# Patient Record
Sex: FEMALE | Race: WHITE
[De-identification: names, ages, dates, MRNs, and addresses within clinical notes are randomized per-mention and may not be internally consistent; named-entity substitution may affect disease eponyms.]

---

## 2017-09-07 ENCOUNTER — HOSPITAL ENCOUNTER (EMERGENCY)
Dept: HOSPITAL 10 - FTE | Age: 42
Discharge: HOME | End: 2017-09-07
Payer: MEDICAID

## 2017-09-07 VITALS
BODY MASS INDEX: 20.51 KG/M2 | HEIGHT: 64 IN | WEIGHT: 120.15 LBS | WEIGHT: 120.15 LBS | HEIGHT: 64 IN | BODY MASS INDEX: 20.51 KG/M2

## 2017-09-07 DIAGNOSIS — Z79.82: ICD-10-CM

## 2017-09-07 DIAGNOSIS — I10: ICD-10-CM

## 2017-09-07 DIAGNOSIS — M25.462: Primary | ICD-10-CM

## 2017-09-07 LAB
ANION GAP SERPL CALC-SCNC: 15 MMOL/L (ref 8–16)
BASOPHILS # BLD AUTO: 0.1 10^3/UL (ref 0–0.1)
BASOPHILS NFR BLD: 0.9 % (ref 0–2)
BUN SERPL-MCNC: 13 MG/DL (ref 7–20)
CALCIUM SERPL-MCNC: 9.2 MG/DL (ref 8.4–10.2)
CHLORIDE SERPL-SCNC: 105 MMOL/L (ref 97–110)
CO2 SERPL-SCNC: 25 MMOL/L (ref 21–31)
CREAT SERPL-MCNC: 0.51 MG/DL (ref 0.44–1)
EOSINOPHIL # BLD: 0.1 10^3/UL (ref 0–0.5)
EOSINOPHIL NFR BLD: 1.5 % (ref 0–7)
ERYTHROCYTE [DISTWIDTH] IN BLOOD BY AUTOMATED COUNT: 12.9 % (ref 11.5–14.5)
GLUCOSE SERPL-MCNC: 94 MG/DL (ref 70–220)
HCT VFR BLD CALC: 44.3 % (ref 37–47)
HGB BLD-MCNC: 14.9 G/DL (ref 12–16)
LYMPHOCYTES # BLD AUTO: 2.2 10^3/UL (ref 0.8–2.9)
LYMPHOCYTES NFR BLD AUTO: 33.3 % (ref 15–51)
MCH RBC QN AUTO: 27.4 PG (ref 29–33)
MCHC RBC AUTO-ENTMCNC: 33.6 G/DL (ref 32–37)
MCV RBC AUTO: 81.4 FL (ref 82–101)
MONOCYTES # BLD: 0.6 10^3/UL (ref 0.3–0.9)
MONOCYTES NFR BLD: 8.4 % (ref 0–11)
NEUTROPHILS NFR BLD AUTO: 55.7 % (ref 39–77)
NRBC # BLD MANUAL: 0 10^3/UL (ref 0–0)
NRBC BLD AUTO-RTO: 0 /100WBC (ref 0–0)
PLATELET # BLD: 285 10^3/UL (ref 140–415)
PMV BLD AUTO: 9.9 FL (ref 7.4–10.4)
POTASSIUM SERPL-SCNC: 3.5 MMOL/L (ref 3.5–5.1)
RBC # BLD AUTO: 5.44 10^6/UL (ref 4.2–5.4)
SODIUM SERPL-SCNC: 141 MMOL/L (ref 135–144)
WBC # BLD AUTO: 6.6 10^3/UL (ref 4.8–10.8)

## 2017-09-07 PROCEDURE — 73562 X-RAY EXAM OF KNEE 3: CPT

## 2017-09-07 PROCEDURE — 85025 COMPLETE CBC W/AUTO DIFF WBC: CPT

## 2017-09-07 PROCEDURE — 93971 EXTREMITY STUDY: CPT

## 2017-09-07 PROCEDURE — 36415 COLL VENOUS BLD VENIPUNCTURE: CPT

## 2017-09-07 PROCEDURE — 80048 BASIC METABOLIC PNL TOTAL CA: CPT

## 2017-09-07 NOTE — RADRPT
PROCEDURE:   Left knee x-ray 

 

CLINICAL INDICATION:   Knee pain 

 

TECHNIQUE:   AP, lateral and oblique views of the left knee were obtained. 

 

COMPARISON:   None 

 

FINDINGS:

There is normal mineralization.  

No acute fracture or dislocation is seen.  

There are no significant degenerative changes.  

There is a small joint effusion. 

There is no significant soft tissue swelling. 

 

RPTAT: AA

 

IMPRESSION:

Small joint effusion.

_____________________________________________ 

.Jae Mcintyre MD, MD           Date    Time 

Electronically viewed and signed by .Jae Mcintyre MD, MD on 09/07/2017 12:08 

 

D:  09/07/2017 12:08  T:  09/07/2017 12:08

.S/

## 2017-09-07 NOTE — RADRPT
PROCEDURE:   US Lower extremity Venous. 

 

CLINICAL INDICATION:   Lower extremity pain and swelling 

 

TECHNIQUE:   Multiple sonographic images of the left lower extremity deep venous system was obtained
 utilizing grayscale, color-flow, compressive sonography and doppler imaging with augmentation.  The
 images were reviewed on a PACS workstation. 

 

COMPARISON:   None. 

 

FINDINGS:

There is normal compressibility and flow within the left common femoral, superficial femoral and pop
liteal veins. Antegrade flow seen in the posterior tibial and peroneal veins.

 

IMPRESSION:

No sonographic evidence for deep venous thrombosis. 

 

 

RPTAT: AA

_____________________________________________ 

.Jared Casillas MD, MD           Date    Time 

Electronically viewed and signed by .Jared Casillas MD, MD on 09/07/2017 11:43 

 

D:  09/07/2017 11:43  T:  09/07/2017 11:43

.P/

## 2017-09-07 NOTE — ERD
ER Documentation


Chief Complaint


Date/Time


DATE: 17 


TIME: 14:31


Chief Complaint


LEFT  KNEE PAIN,SOB STARTED LAST NIGHT.DENIES MED HX





HPI


42-year-old female complaining of left knee pain and swelling 2 weeks.  

Patient stated the pain is worse when she tried to bend her knee.  She is able 

to bear weight.  Denies fall or injury.  Denies fever or chills.  Patient has 

history of hypertension, but is not currently taking any medication.  Denies 

history of gout.





ROS


All systems reviewed and are negative except as per history of present illness.





Medications


Home Meds


Active Scripts


Ibuprofen* (Motrin*) 600 Mg Tab, 600 MG PO Q6H Y for PAIN AND OR ELEVATED TEMP, 

#30 TAB


   Prov:JANNA ZUNIGA NP         17


Metoprolol Tartrate* (Lopressor*) 25 Mg Tab, 12.5 MG PO BID for 30 Days, #60 TAB


   Prov:WILMAR LOVING MD         6/15/16


Reported Medications


Hydrochlorothiazide* (Hydrochlorothiazide*) 12.5 Mg Tablet, 12.5 MG PO DAILY, #

30 TAB


   16


Aspirin (Aspir-Low) 81 Mg Tablet.dr, 81 MG PO DAILY


   16





Allergies


Allergies:  


Coded Allergies:  


     No Known Allergy (Unverified , 16)





PMhx/Soc


History of Surgery:  Yes ( , tubal ligation)


Anesthesia Reaction:  No


Hx Neurological Disorder:  No


Hx Respiratory Disorders:  No


Hx Cardiac Disorders:  Yes (HTN)


Hx Psychiatric Problems:  No


Hx Miscellaneous Medical Probl:  No


Hx Alcohol Use:  No


Hx Substance Use:  No


Hx Tobacco Use:  No


Smoking Status:  Never smoker





Physical Exam


Vitals





Vital Signs








  Date Time  Temp Pulse Resp B/P Pulse Ox O2 Delivery O2 Flow Rate FiO2


 


17 09:32 98.8 86 18 160/83 98   








Physical Exam


General:    Well-developed, well-nourished, conscious and coherent, in no 

distress.  Patient is crying.


Skin:    Warm and dry without rash, good texture and turgor


Head:    Normocephalic without evidence of trauma


Eyes:    Sclera and conjunctivae normal; pupils equal, round, and reactive to 

light; extraocular movements are intact


Chest:    Normal AP diameter.  Good expansion without retractions.  Nontender.  

Lungs are clear to auscultate bilaterally with good tidal volume


Heart:    Regular rate and rhythm.  No murmur, rub, or gallops heard


Extremities:    Swelling and tenderness at the medial superior aspect of the 

left knee, normal passive range of motion.  Good strength bilaterally.  No 

clubbing, cyanosis, or erythema. Peripheral pulses are intact. Sensation intact


Neuro:    Alert and oriented 4, GCS 15.  Cranial nerves grossly intact.  Motor 

and sensory exams nonfocal.  Moves all extremities.  Speech clear.  Gait normal


Result Diagram:  


17 1124                                                                    

            17 1124





Results 24 hrs





 Laboratory Tests








Test


  17


11:24


 


White Blood Count 6.610^3/ul 


 


Red Blood Count 5.4410^6/ul 


 


Hemoglobin 14.9g/dl 


 


Hematocrit 44.3% 


 


Mean Corpuscular Volume 81.4fl 


 


Mean Corpuscular Hemoglobin 27.4pg 


 


Mean Corpuscular Hemoglobin


Concent 33.6g/dl 


 


 


Red Cell Distribution Width 12.9% 


 


Platelet Count 48705^3/UL 


 


Mean Platelet Volume 9.9fl 


 


Neutrophils % 55.7% 


 


Lymphocytes % 33.3% 


 


Monocytes % 8.4% 


 


Eosinophils % 1.5% 


 


Basophils % 0.9% 


 


Nucleated Red Blood Cells % 0.0/100WBC 


 


Neutrophils # (Manual) 3.710^3/ul 


 


Lymphocytes # 2.210^3/ul 


 


Monocytes # 0.610^3/ul 


 


Eosinophils # 0.110^3/ul 


 


Basophils # 0.110^3/ul 


 


Nucleated Red Blood Cells # 0.010^3/ul 


 


Sodium Level 141mmol/L 


 


Potassium Level 3.5mmol/L 


 


Chloride Level 105mmol/L 


 


Carbon Dioxide Level 25mmol/L 


 


Anion Gap 15 


 


Blood Urea Nitrogen 13mg/dl 


 


Creatinine 0.51mg/dl 


 


Glucose Level 94mg/dl 


 


Calcium Level 9.2mg/dl 








 Current Medications








 Medications


  (Trade)  Dose


 Ordered  Sig/Anayeli


 Route


 PRN Reason  Start Time


 Stop Time Status Last Admin


Dose Admin


 


 Ibuprofen


  (Motrin)  600 mg  ONCE  ONCE


 PO


   17 11:30


 17 11:31 DC 17 11:21


 





PROCEDURE:   Left knee x-ray 


 


CLINICAL INDICATION:   Knee pain 


 


TECHNIQUE:   AP, lateral and oblique views of the left knee were obtained. 


 


COMPARISON:   None 


 


FINDINGS:


There is normal mineralization.  


No acute fracture or dislocation is seen.  


There are no significant degenerative changes.  


There is a small joint effusion. 


There is no significant soft tissue swelling. 


 


RPTAT: AA


 


IMPRESSION:


Small joint effusion.


_____________________________________________ 


.Jae Mcintyre MD, MD           Date    Time 


Electronically viewed and signed by .Jae Mcintyre MD, MD on 2017 12:

08 


 


D:  2017 12:08  T:  2017 12:08


.S/





CC: JANNA ZUNIGA NP





PROCEDURE:   US Lower extremity Venous. 


 


CLINICAL INDICATION:   Lower extremity pain and swelling 


 


TECHNIQUE:   Multiple sonographic images of the left lower extremity deep 

venous system was obtained utilizing grayscale, color-flow, compressive 

sonography and doppler imaging with augmentation.  The images were reviewed on 

a PACS workstation. 


 


COMPARISON:   None. 


 


FINDINGS:


There is normal compressibility and flow within the left common femoral, 

superficial femoral and popliteal veins. Antegrade flow seen in the posterior 

tibial and peroneal veins.


 


IMPRESSION:


No sonographic evidence for deep venous thrombosis. 


 


 


RPTAT: AA


_____________________________________________ 


.Jared Casillas MD, MD           Date    Time 


Electronically viewed and signed by .Jared Casillas MD, MD on 2017 11:43 


 


D:  2017 11:43  T:  2017 11:43


.P/





CC: JANNA ZUNIGA NP





Procedures/MDM


42-year-old female present ED was left knee pain 1 week.  X-ray of the left 

knee show a small joint effusion, no acute fracture or dislocation is seen.  

Left lower extremity Doppler ultrasound was obtained to rule out DVT.  No DVT 

was seen.





CBC and BMP are unremarkable.  Low suspicion for septic joint.  I doubt gout.





Likely her joint effusion is secondary arthritis.  Patient is given ibuprofen 

in the ED for pain.  Patient reports pain relief after ibuprofen.





The area of injury was immobilized with an Ace wrap. Patient was noted to be 

comfortable and neurovascularly intact both before and after the immobilization.





Patient appears well, stable for discharge and outpatient management. Patient 

advised to follow-up with her PCP for orthopedic  referral. Medical decision 

making shared with patient and family. Education provided to patient and 

family. Patient and family expressed understanding of the plan.





Medications on discharge: Ibuprofen.


Follow-up: Primary care provider in 2-3 days or return to ED if worse.





Disclaimer: Inadvertent spelling and grammatical errors are likely due to EHR/

dictation software use and do not reflect on the overall quality of patient 

care. Also, please note that the electronic time recorded on this note does not 

necessarily reflect the actual time of the patient encounter.





Departure


Diagnosis:  


 Primary Impression:  


 Knee effusion, left


Condition:  Stable


Patient Instructions:  Knee Effusion


Referrals:  


[facility]


YOU HAVE RECEIVED A MEDICAL SCREENING EXAM AND THE RESULTS INDICATE THAT YOU DO 

NOT HAVE A CONDITION THAT REQUIRES URGENT TREATMENT IN THE EMERGENCY DEPARTMENT.





FURTHER EVALUATION AND TREATMENT OF YOUR CONDITION CAN WAIT UNTIL YOU ARE SEEN 

IN YOUR DOCTORS OFFICE WITHIN THE NEXT 1-2 DAYS. IT IS YOUR RESPONSIBILITY TO 

MAKE AN APPOINTMENT FOR FOLOW-UP CARE.





IF YOU HAVE A PRIMARY DOCTOR


--you should call your primary doctor and schedule an appointment





IF YOU DO NOT HAVE A PRIMARY DOCTOR YOU CAN CALL OUR PHYSICIAN REFERRAL HOTLINE 

AT


 [phone] 





IF YOU CAN NOT AFFORD TO SEE A PHYSICIAN YOU CAN CHOSE FROM THE FOLLOWING 

[facility] [phone] [address] [facility]. [facility] [phone] [address] [facility]. [facility] [phone] [address] [facility] [phone] [address] [facility] [phone] [address] [facility]. [facility]. [phone]


[address]





Additional Instructions:  


Call your primary care doctor TOMORROW for an appointment during the next 2-3 

days.See the doctor sooner or return here if your condition worsens before your 

appointment time.





Ask your primary poriver for a referral to orthopedic specialist.











JANNA ZUNIGA NP Sep 7, 2017 14:41

## 2018-06-18 ENCOUNTER — HOSPITAL ENCOUNTER (EMERGENCY)
Dept: HOSPITAL 91 - E/R | Age: 43
Discharge: HOME | End: 2018-06-18
Payer: MEDICAID

## 2018-06-18 ENCOUNTER — HOSPITAL ENCOUNTER (EMERGENCY)
Age: 43
Discharge: HOME | End: 2018-06-18

## 2018-06-18 DIAGNOSIS — I10: Primary | ICD-10-CM

## 2018-06-18 DIAGNOSIS — Z79.82: ICD-10-CM

## 2018-06-18 LAB
ADD MAN DIFF?: NO
ADD UMIC: YES
ALANINE AMINOTRANSFERASE: 33 IU/L (ref 13–69)
ALBUMIN/GLOBULIN RATIO: 1.19
ALBUMIN: 4.3 G/DL (ref 3.3–4.9)
ALKALINE PHOSPHATASE: 94 IU/L (ref 42–121)
ANION GAP: 14 (ref 8–16)
ASPARTATE AMINO TRANSFERASE: 25 IU/L (ref 15–46)
BASOPHIL #: 0 10^3/UL (ref 0–0.1)
BASOPHILS %: 0.6 % (ref 0–2)
BILIRUBIN,DIRECT: 0 MG/DL (ref 0–0.2)
BILIRUBIN,TOTAL: 0.5 MG/DL (ref 0.2–1.3)
BLOOD UREA NITROGEN: 10 MG/DL (ref 7–20)
CALCIUM: 9.3 MG/DL (ref 8.4–10.2)
CARBON DIOXIDE: 27 MMOL/L (ref 21–31)
CHLORIDE: 104 MMOL/L (ref 97–110)
CREATININE: 0.47 MG/DL (ref 0.44–1)
EOSINOPHILS #: 0.2 10^3/UL (ref 0–0.5)
EOSINOPHILS %: 2.4 % (ref 0–7)
GLOBULIN: 3.6 G/DL (ref 1.3–3.2)
GLUCOSE: 116 MG/DL (ref 70–220)
HEMATOCRIT: 42.2 % (ref 37–47)
HEMOGLOBIN: 13.8 G/DL (ref 12–16)
LIPASE: 152 U/L (ref 23–300)
LYMPHOCYTES #: 2.1 10^3/UL (ref 0.8–2.9)
LYMPHOCYTES %: 33.5 % (ref 15–51)
MEAN CORPUSCULAR HEMOGLOBIN: 27 PG (ref 29–33)
MEAN CORPUSCULAR HGB CONC: 32.7 G/DL (ref 32–37)
MEAN CORPUSCULAR VOLUME: 82.6 FL (ref 82–101)
MEAN PLATELET VOLUME: 9.7 FL (ref 7.4–10.4)
MONOCYTE #: 0.5 10^3/UL (ref 0.3–0.9)
MONOCYTES %: 7.8 % (ref 0–11)
NEUTROPHIL #: 3.5 10^3/UL (ref 1.6–7.5)
NEUTROPHILS %: 55.5 % (ref 39–77)
NUCLEATED RED BLOOD CELLS #: 0 10^3/UL (ref 0–0)
NUCLEATED RED BLOOD CELLS%: 0 /100WBC (ref 0–0)
PLATELET COUNT: 251 10^3/UL (ref 140–415)
POTASSIUM: 3.5 MMOL/L (ref 3.5–5.1)
RED BLOOD COUNT: 5.11 10^6/UL (ref 4.2–5.4)
RED CELL DISTRIBUTION WIDTH: 12.5 % (ref 11.5–14.5)
SODIUM: 141 MMOL/L (ref 135–144)
TOTAL PROTEIN: 7.9 G/DL (ref 6.1–8.1)
TROPONIN-I: < 0.012 NG/ML (ref 0–0.12)
UR ASCORBIC ACID: NEGATIVE MG/DL
UR BACTERIA: (no result) /HPF
UR BILIRUBIN (DIP): NEGATIVE MG/DL
UR BLOOD (DIP): (no result) MG/DL
UR CLARITY: CLEAR
UR COLOR: YELLOW
UR GLUCOSE (DIP): (no result) MG/DL
UR KETONES (DIP): (no result) MG/DL
UR LEUKOCYTE ESTERASE (DIP): NEGATIVE LEU/UL
UR NITRITE (DIP): NEGATIVE MG/DL
UR PH (DIP): 5 (ref 5–9)
UR RBC: 0 /HPF (ref 0–5)
UR SPECIFIC GRAVITY (DIP): 1.01 (ref 1–1.03)
UR SQUAMOUS EPITHELIAL CELL: (no result) /HPF
UR TOTAL PROTEIN (DIP): NEGATIVE MG/DL
UR UROBILINOGEN (DIP): NEGATIVE MG/DL
UR WBC: 0 /HPF (ref 0–5)
WHITE BLOOD COUNT: 6.4 10^3/UL (ref 4.8–10.8)

## 2018-06-18 PROCEDURE — 85025 COMPLETE CBC W/AUTO DIFF WBC: CPT

## 2018-06-18 PROCEDURE — 83690 ASSAY OF LIPASE: CPT

## 2018-06-18 PROCEDURE — 93005 ELECTROCARDIOGRAM TRACING: CPT

## 2018-06-18 PROCEDURE — 80053 COMPREHEN METABOLIC PANEL: CPT

## 2018-06-18 PROCEDURE — 84484 ASSAY OF TROPONIN QUANT: CPT

## 2018-06-18 PROCEDURE — 99284 EMERGENCY DEPT VISIT MOD MDM: CPT

## 2018-06-18 PROCEDURE — 81001 URINALYSIS AUTO W/SCOPE: CPT

## 2018-06-18 PROCEDURE — 36415 COLL VENOUS BLD VENIPUNCTURE: CPT

## 2018-06-18 RX ADMIN — THIAMINE HYDROCHLORIDE 1 MLS/HR: 100 INJECTION, SOLUTION INTRAMUSCULAR; INTRAVENOUS at 11:55
